# Patient Record
Sex: MALE | Race: WHITE | Employment: OTHER | URBAN - METROPOLITAN AREA
[De-identification: names, ages, dates, MRNs, and addresses within clinical notes are randomized per-mention and may not be internally consistent; named-entity substitution may affect disease eponyms.]

---

## 2017-06-20 ENCOUNTER — OFFICE VISIT (OUTPATIENT)
Dept: ENT CLINIC | Age: 77
End: 2017-06-20

## 2017-06-20 VITALS
TEMPERATURE: 97.8 F | DIASTOLIC BLOOD PRESSURE: 62 MMHG | HEIGHT: 70 IN | WEIGHT: 202 LBS | BODY MASS INDEX: 28.92 KG/M2 | SYSTOLIC BLOOD PRESSURE: 107 MMHG

## 2017-06-20 DIAGNOSIS — R07.0 THROAT PAIN: ICD-10-CM

## 2017-06-20 DIAGNOSIS — J04.0 ACUTE LARYNGITIS: Primary | ICD-10-CM

## 2017-06-20 DIAGNOSIS — R49.9 HOARSENESS OR CHANGING VOICE: ICD-10-CM

## 2017-06-20 PROCEDURE — 31575 DIAGNOSTIC LARYNGOSCOPY: CPT | Performed by: OTOLARYNGOLOGY

## 2017-06-20 PROCEDURE — 99213 OFFICE O/P EST LOW 20 MIN: CPT | Performed by: OTOLARYNGOLOGY

## 2017-06-20 RX ORDER — METHYLPREDNISOLONE 4 MG/1
TABLET ORAL
Qty: 1 KIT | Refills: 0 | Status: SHIPPED | OUTPATIENT
Start: 2017-06-20 | End: 2020-06-17

## 2017-06-20 RX ORDER — AMOXICILLIN 500 MG/1
500 CAPSULE ORAL 3 TIMES DAILY
Qty: 30 CAPSULE | Refills: 1 | Status: SHIPPED | OUTPATIENT
Start: 2017-06-20 | End: 2017-06-30

## 2017-06-21 ENCOUNTER — TELEPHONE (OUTPATIENT)
Dept: ENT CLINIC | Age: 77
End: 2017-06-21

## 2017-06-23 ENCOUNTER — TELEPHONE (OUTPATIENT)
Dept: ENT CLINIC | Age: 77
End: 2017-06-23

## 2017-07-06 ENCOUNTER — TELEPHONE (OUTPATIENT)
Dept: ENT CLINIC | Age: 77
End: 2017-07-06

## 2020-06-17 ENCOUNTER — OFFICE VISIT (OUTPATIENT)
Dept: ENT CLINIC | Age: 80
End: 2020-06-17
Payer: COMMERCIAL

## 2020-06-17 VITALS
WEIGHT: 207 LBS | TEMPERATURE: 97.7 F | HEART RATE: 58 BPM | DIASTOLIC BLOOD PRESSURE: 69 MMHG | HEIGHT: 70 IN | SYSTOLIC BLOOD PRESSURE: 138 MMHG | BODY MASS INDEX: 29.63 KG/M2

## 2020-06-17 PROCEDURE — 69210 REMOVE IMPACTED EAR WAX UNI: CPT | Performed by: OTOLARYNGOLOGY

## 2020-06-17 NOTE — PROGRESS NOTES
Cerumen removed right ear with suction and curettes. Ear drum and middle ear normal.  Hearing is now subjectively improved.     Follow up: As needed

## 2021-04-13 ENCOUNTER — NURSE ONLY (OUTPATIENT)
Dept: PRIMARY CARE CLINIC | Age: 81
End: 2021-04-13
Payer: COMMERCIAL

## 2021-04-13 DIAGNOSIS — Z01.818 PREOP EXAMINATION: Primary | ICD-10-CM

## 2021-04-13 LAB — SARS-COV-2: NOT DETECTED

## 2021-04-13 PROCEDURE — 99211 OFF/OP EST MAY X REQ PHY/QHP: CPT | Performed by: NURSE PRACTITIONER

## 2021-04-14 NOTE — PROGRESS NOTES
ENDOSCOPY PREOP PHONE INTERVIEW  INSTRUCTIONS:   Covid test was 4/13. Please continue to quarantine until your procedure    Follow all instructions / preps given to you from your doctor's office. If you have not received these instructions yet, please call the office immediately.  Enter the MAIN entrance located on [employer] [address] and report to the desk on left side of the lobby. Arrival Time: 0800 for your procedure scheduled at: 0930   Bring your insurance & photo ID with you.  Dress comfortably. No lotion, powders or jewelry   Bring an accurate list of your medications with doses/ frequency with you day of the procedure, including over the counter medications. If you are taking blood thinners, ASA or diabetic medication, make sure to call Dr. Christy Velasquez  or your PCP for instructions prior to your procedure.  Arrange for someone to be with you and sign you out & drive you home after your procedure.  We are allowing 1 visitor with you in the hospital.        If you have further questions, you may contact your Endoscopist's office or Pre Admission Testing staff at [phone]  Alma Barraza 4/14/2021 .5:02 PM

## 2021-04-19 ENCOUNTER — HOSPITAL ENCOUNTER (OUTPATIENT)
Age: 81
Setting detail: OUTPATIENT SURGERY
Discharge: HOME | End: 2021-04-19
Attending: INTERNAL MEDICINE | Admitting: INTERNAL MEDICINE
Payer: COMMERCIAL

## 2021-04-19 VITALS
SYSTOLIC BLOOD PRESSURE: 142 MMHG | DIASTOLIC BLOOD PRESSURE: 69 MMHG | HEIGHT: 71 IN | BODY MASS INDEX: 30.38 KG/M2 | TEMPERATURE: 97.5 F | WEIGHT: 217 LBS | HEART RATE: 76 BPM | OXYGEN SATURATION: 96 % | RESPIRATION RATE: 18 BRPM

## 2021-04-19 PROCEDURE — 7100000011 HC PHASE II RECOVERY - ADDTL 15 MIN: Performed by: INTERNAL MEDICINE

## 2021-04-19 PROCEDURE — 7100000010 HC PHASE II RECOVERY - FIRST 15 MIN: Performed by: INTERNAL MEDICINE

## 2021-04-19 PROCEDURE — 2580000003 HC RX 258: Performed by: INTERNAL MEDICINE

## 2021-04-19 PROCEDURE — 99152 MOD SED SAME PHYS/QHP 5/>YRS: CPT | Performed by: INTERNAL MEDICINE

## 2021-04-19 PROCEDURE — 3609027000 HC COLONOSCOPY: Performed by: INTERNAL MEDICINE

## 2021-04-19 PROCEDURE — 6360000002 HC RX W HCPCS: Performed by: INTERNAL MEDICINE

## 2021-04-19 PROCEDURE — 99153 MOD SED SAME PHYS/QHP EA: CPT | Performed by: INTERNAL MEDICINE

## 2021-04-19 PROCEDURE — 2709999900 HC NON-CHARGEABLE SUPPLY: Performed by: INTERNAL MEDICINE

## 2021-04-19 RX ORDER — FENTANYL CITRATE 50 UG/ML
INJECTION, SOLUTION INTRAMUSCULAR; INTRAVENOUS PRN
Status: DISCONTINUED | OUTPATIENT
Start: 2021-04-19 | End: 2021-04-19 | Stop reason: ALTCHOICE

## 2021-04-19 RX ORDER — DEXTROSE AND SODIUM CHLORIDE 5; .9 G/100ML; G/100ML
INJECTION, SOLUTION INTRAVENOUS ONCE
Status: COMPLETED | OUTPATIENT
Start: 2021-04-19 | End: 2021-04-19

## 2021-04-19 RX ORDER — MIDAZOLAM HYDROCHLORIDE 1 MG/ML
INJECTION INTRAMUSCULAR; INTRAVENOUS PRN
Status: DISCONTINUED | OUTPATIENT
Start: 2021-04-19 | End: 2021-04-19 | Stop reason: ALTCHOICE

## 2021-04-19 RX ADMIN — DEXTROSE AND SODIUM CHLORIDE: 5; 900 INJECTION, SOLUTION INTRAVENOUS at 08:53

## 2021-04-19 ASSESSMENT — PAIN - FUNCTIONAL ASSESSMENT
PAIN_FUNCTIONAL_ASSESSMENT: FACES
PAIN_FUNCTIONAL_ASSESSMENT: 0-10
PAIN_FUNCTIONAL_ASSESSMENT: FACES

## 2021-04-19 NOTE — H&P
1963     Years since quittin.3    Smokeless tobacco: Never Used   Substance and Sexual Activity    Alcohol use: Yes     Comment: rarely    Drug use: No    Sexual activity: Not on file   Lifestyle    Physical activity     Days per week: Not on file     Minutes per session: Not on file    Stress: Not on file   Relationships    Social connections     Talks on phone: Not on file     Gets together: Not on file     Attends Rastafari service: Not on file     Active member of club or organization: Not on file     Attends meetings of clubs or organizations: Not on file     Relationship status: Not on file    Intimate partner violence     Fear of current or ex partner: Not on file     Emotionally abused: Not on file     Physically abused: Not on file     Forced sexual activity: Not on file   Other Topics Concern    Not on file   Social History Narrative    ** Merged History Encounter **            Family Hx:   Family History   Problem Relation Age of Onset    Diabetes Mother         dm    Hypertension Mother     Hypertension Father     Cancer Father         bladder       Physical Exam:  Vital Signs: Ht 5' 10\" (1.778 m)   Wt 205 lb (93 kg)   BMI 29.41 kg/m²    Pulmonary: Normal  Cardiac: Normal  Abdomen: Normal    Pre-Procedure Assessment / Plan:  ASA Classification: Class 2 - A normal healthy patient with mild systemic disease  Level of Sedation Plan: Moderate sedation   Mallampati Score:  I (soft palate, uvula, fauces, tonsillar pillars visible)  Post Procedure plan: Return to same level of care    Colonoscopy Interval History:  3 or more years since last colonoscopy, Less than 3 years since the patient's last colonoscopy due to medical reasons and Less than 3 years since the patient's last colonoscopy due to system reasons    Medical Reason for Colonoscopy before 3 years last colonoscopy incomplete, last colonoscopy had inadequate prep, piecemeal removal of adenomas and last colonoscopy found greater than 10 adenomas  System Reasons for Colonoscopy before 3 years:   previous colonoscopy report unavailable or unable to locate    I assessed the patient and find that the patient is in satisfactory condition to proceed with the planned procedure and sedation plan. Risks/benefits/alternatives of procedure discussed with patient and any present family members. Risks including, but not limited to: bleeding, perforation, post polypectomy syndrome, splenic injury, need for additional procedures or surgery, risks of anesthesia. Patient understands it is their responsibility to call office for pathology results if they do not hear from my office within 1-2 weeks. All questions answered.     Tadeo Horta MD  4/19/2021

## 2021-04-19 NOTE — BRIEF OP NOTE
Brief Postoperative Note      Patient: Merlyn Ram  YOB: 1940  MRN: [id number]    Date of Procedure: 4/19/2021    Pre-Op Diagnosis: Hx of colonic polyps [Z86.010]    Post-Op Diagnosis: Same       Procedure(s):  COLONOSCOPY    Surgeon(s):  Sapna Castañeda MD    Assistant:  * No surgical staff found *    Anesthesia: IV Sedation    Estimated Blood Loss (mL): Minimal    Complications: None    Specimens:   * No specimens in log *    Implants:  * No implants in log *      Drains: * No LDAs found *    Findings: diverticulosis    Electronically signed by Ju Monroe MD on 4/19/2021 at 9:39 AM

## 2021-06-02 ENCOUNTER — CLINICAL DOCUMENTATION (OUTPATIENT)
Dept: OTHER | Age: 81
End: 2021-06-02

## 2022-12-20 ENCOUNTER — OFFICE VISIT (OUTPATIENT)
Dept: ENT CLINIC | Age: 82
End: 2022-12-20
Payer: MEDICARE

## 2022-12-20 VITALS
SYSTOLIC BLOOD PRESSURE: 123 MMHG | TEMPERATURE: 97.4 F | BODY MASS INDEX: 28.67 KG/M2 | DIASTOLIC BLOOD PRESSURE: 62 MMHG | HEIGHT: 71 IN | WEIGHT: 204.8 LBS | HEART RATE: 60 BPM

## 2022-12-20 DIAGNOSIS — H90.6 MIXED CONDUCTIVE AND SENSORINEURAL HEARING LOSS OF BOTH EARS: ICD-10-CM

## 2022-12-20 DIAGNOSIS — R49.9 HOARSENESS OR CHANGING VOICE: Primary | ICD-10-CM

## 2022-12-20 DIAGNOSIS — H61.23 BILATERAL IMPACTED CERUMEN: ICD-10-CM

## 2022-12-20 PROCEDURE — 99213 OFFICE O/P EST LOW 20 MIN: CPT | Performed by: OTOLARYNGOLOGY

## 2022-12-20 PROCEDURE — G8484 FLU IMMUNIZE NO ADMIN: HCPCS | Performed by: OTOLARYNGOLOGY

## 2022-12-20 PROCEDURE — 3078F DIAST BP <80 MM HG: CPT | Performed by: OTOLARYNGOLOGY

## 2022-12-20 PROCEDURE — 1123F ACP DISCUSS/DSCN MKR DOCD: CPT | Performed by: OTOLARYNGOLOGY

## 2022-12-20 PROCEDURE — 31575 DIAGNOSTIC LARYNGOSCOPY: CPT | Performed by: OTOLARYNGOLOGY

## 2022-12-20 PROCEDURE — G8417 CALC BMI ABV UP PARAM F/U: HCPCS | Performed by: OTOLARYNGOLOGY

## 2022-12-20 PROCEDURE — G8427 DOCREV CUR MEDS BY ELIG CLIN: HCPCS | Performed by: OTOLARYNGOLOGY

## 2022-12-20 PROCEDURE — 3074F SYST BP LT 130 MM HG: CPT | Performed by: OTOLARYNGOLOGY

## 2022-12-20 PROCEDURE — 1036F TOBACCO NON-USER: CPT | Performed by: OTOLARYNGOLOGY

## 2022-12-20 PROCEDURE — 69210 REMOVE IMPACTED EAR WAX UNI: CPT | Performed by: OTOLARYNGOLOGY

## 2022-12-20 NOTE — PROGRESS NOTES
FOLLOW UP VISIT:    CHIEF COMPLAINT: Hoarseness    INTERIM HISTORY: Sensorineural hearing loss. Has hearing aids. Hearing has decreased to recently. He went to the hearing aid dealer and they noted that his ears are impacted with cerumen. In addition he has a concern about his voice. He is a professional voice user. He relates the onset to a respiratory infection which was not COVID-19. PAST MEDICAL HISTORY:   Social History     Tobacco Use   Smoking Status Former    Types: Cigarettes    Quit date: 1964    Years since quittin.0   Smokeless Tobacco Never                                                    Social History     Substance and Sexual Activity   Alcohol Use Yes    Comment: rare- few times year                                                    Current Outpatient Medications:     cetirizine (ZYRTEC) 10 MG tablet, Take 10 mg by mouth., Disp: , Rfl:     atenolol (TENORMIN) 50 MG tablet, Take 50 mg by mouth., Disp: , Rfl:     tamsulosin (FLOMAX) 0.4 MG capsule, Take 1 capsule by mouth daily. , Disp: 90 capsule, Rfl: 3    aspirin 81 MG EC tablet, Take 81 mg by mouth daily.   , Disp: , Rfl:                                                  Past Medical History:   Diagnosis Date    Allergic rhinitis     chronic     Dental disease     Diverticulosis of colon     GERD (gastroesophageal reflux disease)     Hearing loss     Hepatic abscess 01    Hx of colonic polyp     ascending, small focus of tubular adenoma (low grade dysplasia)    Hypertension     Internal hemorrhoid     Pulmonary embolism ([address] 2012    post op    Sigmoid diverticulitis                                                     Past Surgical History:   Procedure Laterality Date    COLONOSCOPY  16    3y    COLONOSCOPY N/A 2021    COLONOSCOPY performed by Margarita Sibley MD at [facility]  2012    R Ankle    LEG SURGERY  teenage    infection    OTHER SURGICAL HISTORY  3/22/2012    [facility] bi-maleolar fx    SINUS SURGERY      TONSILLECTOMY         FAMILY HISTORY: Family history reviewed and except as pertinent to the interim history is not contributory. REVIEW OF SYSTEMS:  All pertinent positive and negative findings included in HPI. Otherwise, all other systems are reviewed and are negative    PHYSICAL EXAMINATION:   GENERAL: wdwn- no acute distress  RESPIRATORY: No stridor. COMMUNICATION :  Normal voice  MENTAL STATUS: Alert and oriented x3  HEAD AND FACE: No skin lesions of the face. EXTERNAL EARS AND NOSE: The external ears and nasal pyramid are normal.  FACIAL MUSCLES: All branches of the facial nerve intact. FACE PALPATION: Zygomatic arches and orbital rims are intact. No tenderness over the sinuses. EXTRAOCULAR MUSCLES: Intact with full range of motion. OTOSCOPY: Cerumen occludes both external auditory canals. Cerumen removed from both external auditory canals. TUNING FORKS:  Rinne ++ Alva midline at 512 Hz  INTRANASAL:  Septum midline, turbinates normal, meati clear. LIPS, TEETH, GINGIVA:  Normal mucosa  PHARYNX:  Normal  NECK:  No masses. LYMPH NODES: No cervical lymphadenopathy. SALIVARY GLANDS: Parotid and submandibular glands normal.  THYROID: No goiter or thyroid nodules palpable. As the patient has symptoms suggestive of disease in the larynx or hypopharynx, fiberoptic laryngoscopy is performed. FIBEROPTIC LARYNGOSCOPY:  Nares topically anaesthetized with lidocaine spray. Fiberoptic scope passed per naris into nasopharynx and hypopharyrnx and larynx visualized. Normal tongue base                                                          Normal epiglottis                                                          Normal vocal cords                                                          Normal pyriform sinuses   IMPRESSION: Normal larynx.     PLAN: I have reassured the patient that there is no pathology on his vocal cords and that they move well. Trial of guaifenesin 2400 mg/day. FOLLOW-UP: As needed.

## 2023-03-09 ENCOUNTER — HOSPITAL ENCOUNTER (OUTPATIENT)
Dept: CARDIAC REHAB | Age: 83
Setting detail: THERAPIES SERIES
Discharge: HOME | End: 2023-03-09
Payer: MEDICARE

## 2023-03-09 PROCEDURE — 93798 PHYS/QHP OP CAR RHAB W/ECG: CPT

## 2023-03-15 ENCOUNTER — HOSPITAL ENCOUNTER (OUTPATIENT)
Dept: CARDIAC REHAB | Age: 83
Setting detail: THERAPIES SERIES
Discharge: HOME | End: 2023-03-15
Payer: MEDICARE

## 2023-03-15 PROCEDURE — 93798 PHYS/QHP OP CAR RHAB W/ECG: CPT

## 2023-03-17 ENCOUNTER — HOSPITAL ENCOUNTER (OUTPATIENT)
Dept: CARDIAC REHAB | Age: 83
Setting detail: THERAPIES SERIES
Discharge: HOME | End: 2023-03-17
Payer: MEDICARE

## 2023-03-17 PROCEDURE — 93798 PHYS/QHP OP CAR RHAB W/ECG: CPT

## 2023-03-20 ENCOUNTER — HOSPITAL ENCOUNTER (OUTPATIENT)
Dept: CARDIAC REHAB | Age: 83
Setting detail: THERAPIES SERIES
Discharge: HOME | End: 2023-03-20
Payer: MEDICARE

## 2023-03-20 PROCEDURE — 93798 PHYS/QHP OP CAR RHAB W/ECG: CPT

## 2023-03-22 ENCOUNTER — HOSPITAL ENCOUNTER (OUTPATIENT)
Dept: CARDIAC REHAB | Age: 83
Setting detail: THERAPIES SERIES
Discharge: HOME | End: 2023-03-22
Payer: MEDICARE

## 2023-03-22 PROCEDURE — 93798 PHYS/QHP OP CAR RHAB W/ECG: CPT

## 2023-03-24 ENCOUNTER — HOSPITAL ENCOUNTER (OUTPATIENT)
Dept: CARDIAC REHAB | Age: 83
Setting detail: THERAPIES SERIES
Discharge: HOME | End: 2023-03-24
Payer: MEDICARE

## 2023-03-24 PROCEDURE — 93798 PHYS/QHP OP CAR RHAB W/ECG: CPT

## 2023-03-27 ENCOUNTER — HOSPITAL ENCOUNTER (OUTPATIENT)
Dept: CARDIAC REHAB | Age: 83
Setting detail: THERAPIES SERIES
Discharge: HOME | End: 2023-03-27
Payer: MEDICARE

## 2023-03-27 PROCEDURE — 93798 PHYS/QHP OP CAR RHAB W/ECG: CPT

## 2023-03-29 ENCOUNTER — HOSPITAL ENCOUNTER (OUTPATIENT)
Dept: CARDIAC REHAB | Age: 83
Setting detail: THERAPIES SERIES
Discharge: HOME | End: 2023-03-29
Payer: MEDICARE

## 2023-03-29 PROCEDURE — 93798 PHYS/QHP OP CAR RHAB W/ECG: CPT

## 2023-03-31 ENCOUNTER — HOSPITAL ENCOUNTER (OUTPATIENT)
Dept: CARDIAC REHAB | Age: 83
Setting detail: THERAPIES SERIES
Discharge: HOME | End: 2023-03-31
Payer: MEDICARE

## 2023-03-31 PROCEDURE — 93798 PHYS/QHP OP CAR RHAB W/ECG: CPT

## 2023-04-03 ENCOUNTER — HOSPITAL ENCOUNTER (OUTPATIENT)
Dept: CARDIAC REHAB | Age: 83
Setting detail: THERAPIES SERIES
Discharge: HOME | End: 2023-04-03
Payer: MEDICARE

## 2023-04-03 PROCEDURE — 93798 PHYS/QHP OP CAR RHAB W/ECG: CPT

## 2023-04-05 ENCOUNTER — HOSPITAL ENCOUNTER (OUTPATIENT)
Dept: CARDIAC REHAB | Age: 83
Setting detail: THERAPIES SERIES
Discharge: HOME | End: 2023-04-05
Payer: MEDICARE

## 2023-04-05 PROCEDURE — 93798 PHYS/QHP OP CAR RHAB W/ECG: CPT

## 2023-04-07 ENCOUNTER — HOSPITAL ENCOUNTER (OUTPATIENT)
Dept: CARDIAC REHAB | Age: 83
Setting detail: THERAPIES SERIES
Discharge: HOME | End: 2023-04-07
Payer: MEDICARE

## 2023-04-07 PROCEDURE — 93798 PHYS/QHP OP CAR RHAB W/ECG: CPT

## 2023-04-17 ENCOUNTER — HOSPITAL ENCOUNTER (OUTPATIENT)
Dept: CARDIAC REHAB | Age: 83
Setting detail: THERAPIES SERIES
Discharge: HOME | End: 2023-04-17
Payer: MEDICARE

## 2023-04-17 PROCEDURE — 93798 PHYS/QHP OP CAR RHAB W/ECG: CPT

## 2023-04-19 ENCOUNTER — HOSPITAL ENCOUNTER (OUTPATIENT)
Dept: CARDIAC REHAB | Age: 83
Setting detail: THERAPIES SERIES
Discharge: HOME | End: 2023-04-19
Payer: MEDICARE

## 2023-04-19 PROCEDURE — 93798 PHYS/QHP OP CAR RHAB W/ECG: CPT

## 2023-04-21 ENCOUNTER — HOSPITAL ENCOUNTER (OUTPATIENT)
Dept: CARDIAC REHAB | Age: 83
Setting detail: THERAPIES SERIES
Discharge: HOME | End: 2023-04-21
Payer: MEDICARE

## 2023-04-21 PROCEDURE — 93798 PHYS/QHP OP CAR RHAB W/ECG: CPT

## 2023-04-24 ENCOUNTER — APPOINTMENT (OUTPATIENT)
Dept: CARDIAC REHAB | Age: 83
End: 2023-04-24
Payer: MEDICARE

## 2023-04-26 ENCOUNTER — HOSPITAL ENCOUNTER (OUTPATIENT)
Dept: CARDIAC REHAB | Age: 83
Setting detail: THERAPIES SERIES
Discharge: HOME | End: 2023-04-26
Payer: MEDICARE

## 2023-04-26 PROCEDURE — 93798 PHYS/QHP OP CAR RHAB W/ECG: CPT

## 2023-04-28 ENCOUNTER — HOSPITAL ENCOUNTER (OUTPATIENT)
Dept: CARDIAC REHAB | Age: 83
Setting detail: THERAPIES SERIES
Discharge: HOME | End: 2023-04-28
Payer: MEDICARE

## 2023-04-28 PROCEDURE — 93798 PHYS/QHP OP CAR RHAB W/ECG: CPT

## 2023-05-01 ENCOUNTER — HOSPITAL ENCOUNTER (OUTPATIENT)
Dept: CARDIAC REHAB | Age: 83
Setting detail: THERAPIES SERIES
Discharge: HOME | End: 2023-05-01
Payer: MEDICARE

## 2023-05-01 PROCEDURE — 93798 PHYS/QHP OP CAR RHAB W/ECG: CPT

## 2023-05-03 ENCOUNTER — HOSPITAL ENCOUNTER (OUTPATIENT)
Dept: CARDIAC REHAB | Age: 83
Setting detail: THERAPIES SERIES
Discharge: HOME | End: 2023-05-03
Payer: MEDICARE

## 2023-05-03 PROCEDURE — 93798 PHYS/QHP OP CAR RHAB W/ECG: CPT

## 2023-05-05 ENCOUNTER — HOSPITAL ENCOUNTER (OUTPATIENT)
Dept: CARDIAC REHAB | Age: 83
Setting detail: THERAPIES SERIES
Discharge: HOME | End: 2023-05-05
Payer: MEDICARE

## 2023-05-05 PROCEDURE — 93798 PHYS/QHP OP CAR RHAB W/ECG: CPT

## 2023-05-08 ENCOUNTER — HOSPITAL ENCOUNTER (OUTPATIENT)
Dept: CARDIAC REHAB | Age: 83
Setting detail: THERAPIES SERIES
Discharge: HOME | End: 2023-05-08
Payer: MEDICARE

## 2023-05-08 PROCEDURE — 93798 PHYS/QHP OP CAR RHAB W/ECG: CPT

## 2023-05-10 ENCOUNTER — HOSPITAL ENCOUNTER (OUTPATIENT)
Dept: CARDIAC REHAB | Age: 83
Setting detail: THERAPIES SERIES
Discharge: HOME | End: 2023-05-10
Payer: MEDICARE

## 2023-05-10 PROCEDURE — 93798 PHYS/QHP OP CAR RHAB W/ECG: CPT

## 2023-05-12 ENCOUNTER — HOSPITAL ENCOUNTER (OUTPATIENT)
Dept: CARDIAC REHAB | Age: 83
Setting detail: THERAPIES SERIES
Discharge: HOME | End: 2023-05-12
Payer: MEDICARE

## 2023-05-12 PROCEDURE — 93798 PHYS/QHP OP CAR RHAB W/ECG: CPT

## 2023-05-15 ENCOUNTER — APPOINTMENT (OUTPATIENT)
Dept: CARDIAC REHAB | Age: 83
End: 2023-05-15
Payer: MEDICARE

## 2023-05-17 ENCOUNTER — HOSPITAL ENCOUNTER (OUTPATIENT)
Dept: CARDIAC REHAB | Age: 83
Setting detail: THERAPIES SERIES
Discharge: HOME | End: 2023-05-17
Payer: MEDICARE

## 2023-05-17 PROCEDURE — 93798 PHYS/QHP OP CAR RHAB W/ECG: CPT

## 2023-05-19 ENCOUNTER — HOSPITAL ENCOUNTER (OUTPATIENT)
Dept: CARDIAC REHAB | Age: 83
Setting detail: THERAPIES SERIES
Discharge: HOME | End: 2023-05-19
Payer: MEDICARE

## 2023-05-19 PROCEDURE — 93798 PHYS/QHP OP CAR RHAB W/ECG: CPT

## 2023-05-22 ENCOUNTER — APPOINTMENT (OUTPATIENT)
Dept: CARDIAC REHAB | Age: 83
End: 2023-05-22
Payer: MEDICARE

## 2023-05-24 ENCOUNTER — HOSPITAL ENCOUNTER (OUTPATIENT)
Dept: CARDIAC REHAB | Age: 83
Setting detail: THERAPIES SERIES
Discharge: HOME | End: 2023-05-24
Payer: MEDICARE

## 2023-05-24 PROCEDURE — 93798 PHYS/QHP OP CAR RHAB W/ECG: CPT

## 2023-05-26 ENCOUNTER — HOSPITAL ENCOUNTER (OUTPATIENT)
Dept: CARDIAC REHAB | Age: 83
Setting detail: THERAPIES SERIES
Discharge: HOME | End: 2023-05-26
Payer: MEDICARE

## 2023-05-26 PROCEDURE — 93798 PHYS/QHP OP CAR RHAB W/ECG: CPT

## 2023-05-31 ENCOUNTER — HOSPITAL ENCOUNTER (OUTPATIENT)
Dept: CARDIAC REHAB | Age: 83
Setting detail: THERAPIES SERIES
Discharge: HOME | End: 2023-05-31
Payer: MEDICARE

## 2023-05-31 PROCEDURE — 93798 PHYS/QHP OP CAR RHAB W/ECG: CPT

## 2023-06-02 ENCOUNTER — HOSPITAL ENCOUNTER (OUTPATIENT)
Dept: CARDIAC REHAB | Age: 83
Setting detail: THERAPIES SERIES
Discharge: HOME | End: 2023-06-02
Payer: MEDICARE

## 2023-06-02 PROCEDURE — 93798 PHYS/QHP OP CAR RHAB W/ECG: CPT

## 2023-06-05 ENCOUNTER — HOSPITAL ENCOUNTER (OUTPATIENT)
Dept: CARDIAC REHAB | Age: 83
Setting detail: THERAPIES SERIES
Discharge: HOME | End: 2023-06-05
Payer: MEDICARE

## 2023-06-05 PROCEDURE — 93798 PHYS/QHP OP CAR RHAB W/ECG: CPT

## 2023-06-07 ENCOUNTER — HOSPITAL ENCOUNTER (OUTPATIENT)
Dept: CARDIAC REHAB | Age: 83
Setting detail: THERAPIES SERIES
Discharge: HOME | End: 2023-06-07
Payer: MEDICARE

## 2023-06-07 PROCEDURE — 93798 PHYS/QHP OP CAR RHAB W/ECG: CPT

## 2023-06-09 ENCOUNTER — HOSPITAL ENCOUNTER (OUTPATIENT)
Dept: CARDIAC REHAB | Age: 83
Setting detail: THERAPIES SERIES
Discharge: HOME | End: 2023-06-09
Payer: MEDICARE

## 2023-06-09 PROCEDURE — 93798 PHYS/QHP OP CAR RHAB W/ECG: CPT

## 2023-06-12 ENCOUNTER — APPOINTMENT (OUTPATIENT)
Dept: CARDIAC REHAB | Age: 83
End: 2023-06-12
Payer: MEDICARE

## 2023-06-14 ENCOUNTER — APPOINTMENT (OUTPATIENT)
Dept: CARDIAC REHAB | Age: 83
End: 2023-06-14
Payer: MEDICARE

## 2023-08-22 ENCOUNTER — TELEPHONE (OUTPATIENT)
Dept: ENT CLINIC | Age: 83
End: 2023-08-22

## 2023-08-22 NOTE — TELEPHONE ENCOUNTER
Patient would like to have RX called in for laryengitis please let patient know pt does have appt 8/23 thank you

## 2023-08-23 ENCOUNTER — OFFICE VISIT (OUTPATIENT)
Dept: ENT CLINIC | Age: 83
End: 2023-08-23
Payer: MEDICARE

## 2023-08-23 VITALS
TEMPERATURE: 97.5 F | HEART RATE: 62 BPM | HEIGHT: 70 IN | SYSTOLIC BLOOD PRESSURE: 136 MMHG | DIASTOLIC BLOOD PRESSURE: 61 MMHG | BODY MASS INDEX: 28.32 KG/M2 | WEIGHT: 197.8 LBS

## 2023-08-23 DIAGNOSIS — R49.9 HOARSENESS OR CHANGING VOICE: Primary | ICD-10-CM

## 2023-08-23 DIAGNOSIS — J38.4 LARYNGEAL EDEMA: ICD-10-CM

## 2023-08-23 PROCEDURE — 99213 OFFICE O/P EST LOW 20 MIN: CPT | Performed by: OTOLARYNGOLOGY

## 2023-08-23 PROCEDURE — G8417 CALC BMI ABV UP PARAM F/U: HCPCS | Performed by: OTOLARYNGOLOGY

## 2023-08-23 PROCEDURE — 3078F DIAST BP <80 MM HG: CPT | Performed by: OTOLARYNGOLOGY

## 2023-08-23 PROCEDURE — G8427 DOCREV CUR MEDS BY ELIG CLIN: HCPCS | Performed by: OTOLARYNGOLOGY

## 2023-08-23 PROCEDURE — 1123F ACP DISCUSS/DSCN MKR DOCD: CPT | Performed by: OTOLARYNGOLOGY

## 2023-08-23 PROCEDURE — 1036F TOBACCO NON-USER: CPT | Performed by: OTOLARYNGOLOGY

## 2023-08-23 PROCEDURE — 3075F SYST BP GE 130 - 139MM HG: CPT | Performed by: OTOLARYNGOLOGY

## 2023-08-23 PROCEDURE — 96372 THER/PROPH/DIAG INJ SC/IM: CPT | Performed by: OTOLARYNGOLOGY

## 2023-08-23 RX ORDER — ATORVASTATIN CALCIUM 40 MG/1
40 TABLET, FILM COATED ORAL DAILY
COMMUNITY

## 2023-08-23 RX ORDER — BETAMETHASONE SODIUM PHOSPHATE AND BETAMETHASONE ACETATE 3; 3 MG/ML; MG/ML
6 INJECTION, SUSPENSION INTRA-ARTICULAR; INTRALESIONAL; INTRAMUSCULAR; SOFT TISSUE ONCE
Status: COMPLETED | OUTPATIENT
Start: 2023-08-23 | End: 2023-08-23

## 2023-08-23 RX ADMIN — BETAMETHASONE SODIUM PHOSPHATE AND BETAMETHASONE ACETATE 6 MG: 3; 3 INJECTION, SUSPENSION INTRA-ARTICULAR; INTRALESIONAL; INTRAMUSCULAR; SOFT TISSUE at 16:23

## 2023-08-31 ENCOUNTER — OFFICE VISIT (OUTPATIENT)
Dept: ENT CLINIC | Age: 83
End: 2023-08-31
Payer: MEDICARE

## 2023-08-31 VITALS
SYSTOLIC BLOOD PRESSURE: 108 MMHG | DIASTOLIC BLOOD PRESSURE: 56 MMHG | WEIGHT: 195 LBS | HEIGHT: 70 IN | TEMPERATURE: 97.5 F | BODY MASS INDEX: 27.92 KG/M2 | HEART RATE: 63 BPM

## 2023-08-31 DIAGNOSIS — R49.9 HOARSENESS OR CHANGING VOICE: Primary | ICD-10-CM

## 2023-08-31 DIAGNOSIS — J38.4 LARYNGEAL EDEMA: ICD-10-CM

## 2023-08-31 PROCEDURE — 3078F DIAST BP <80 MM HG: CPT | Performed by: OTOLARYNGOLOGY

## 2023-08-31 PROCEDURE — G8417 CALC BMI ABV UP PARAM F/U: HCPCS | Performed by: OTOLARYNGOLOGY

## 2023-08-31 PROCEDURE — 1036F TOBACCO NON-USER: CPT | Performed by: OTOLARYNGOLOGY

## 2023-08-31 PROCEDURE — 3074F SYST BP LT 130 MM HG: CPT | Performed by: OTOLARYNGOLOGY

## 2023-08-31 PROCEDURE — G8427 DOCREV CUR MEDS BY ELIG CLIN: HCPCS | Performed by: OTOLARYNGOLOGY

## 2023-08-31 PROCEDURE — 1123F ACP DISCUSS/DSCN MKR DOCD: CPT | Performed by: OTOLARYNGOLOGY

## 2023-08-31 PROCEDURE — 99212 OFFICE O/P EST SF 10 MIN: CPT | Performed by: OTOLARYNGOLOGY

## 2023-08-31 RX ORDER — METHYLPREDNISOLONE 4 MG/1
TABLET ORAL
Qty: 1 KIT | Refills: 0 | Status: SHIPPED | OUTPATIENT
Start: 2023-08-31

## 2023-08-31 NOTE — PROGRESS NOTES
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 Fiberoptic laryngoscopy showed some vocal cord edema. The left is worse than the right. I prescribed betamethasone 6 mg and his voice improved quickly and he was able to do the performance. Now he is concerned about worsening voice. PAST MEDICAL HISTORY:   Social History     Tobacco Use   Smoking Status Former    Types: Cigarettes    Quit date: 1964    Years since quittin.7   Smokeless Tobacco Never                                                    Social History     Substance and Sexual Activity   Alcohol Use Yes    Comment: rare- few times year                                                    Current Outpatient Medications:     ticagrelor (BRILINTA) 90 MG TABS tablet, Take 1 tablet by mouth 2 times daily, Disp: , Rfl:     atorvastatin (LIPITOR) 40 MG tablet, Take 1 tablet by mouth daily, Disp: , Rfl:     cetirizine (ZYRTEC) 10 MG tablet, Take 1 tablet by mouth, Disp: , Rfl:     atenolol (TENORMIN) 50 MG tablet, Take 1 tablet by mouth, Disp: , Rfl:     tamsulosin (FLOMAX) 0.4 MG capsule, Take 1 capsule by mouth daily. , Disp: 90 capsule, Rfl: 3    aspirin 81 MG EC tablet, Take 1 tablet by mouth daily, Disp: , Rfl:                                                  Past Medical History:   Diagnosis Date    Allergic rhinitis     chronic     Dental disease     Diverticulosis of colon     GERD (gastroesophageal reflux disease)     Hearing loss     Hepatic abscess 01    Hx of colonic polyp     ascending, small focus of tubular adenoma (low grade dysplasia)    Hypertension     Internal hemorrhoid     Pulmonary embolism ([address]) 2012    post op    Sigmoid diverticulitis                                                     Past Surgical History:   Procedure Laterality Date    COLONOSCOPY  16    4y    COLONOSCOPY N/A

## 2024-10-22 ENCOUNTER — OFFICE VISIT (OUTPATIENT)
Dept: ENT CLINIC | Age: 84
End: 2024-10-22
Payer: MEDICARE

## 2024-10-22 VITALS
HEART RATE: 72 BPM | HEIGHT: 71 IN | WEIGHT: 195.8 LBS | SYSTOLIC BLOOD PRESSURE: 109 MMHG | DIASTOLIC BLOOD PRESSURE: 55 MMHG | TEMPERATURE: 97.9 F | BODY MASS INDEX: 27.41 KG/M2

## 2024-10-22 DIAGNOSIS — H90.6 MIXED CONDUCTIVE AND SENSORINEURAL HEARING LOSS OF BOTH EARS: ICD-10-CM

## 2024-10-22 DIAGNOSIS — H61.23 BILATERAL IMPACTED CERUMEN: Primary | ICD-10-CM

## 2024-10-22 PROCEDURE — 69210 REMOVE IMPACTED EAR WAX UNI: CPT | Performed by: OTOLARYNGOLOGY

## 2024-10-22 RX ORDER — CILOSTAZOL 100 MG/1
100 TABLET ORAL 2 TIMES DAILY
COMMUNITY

## 2024-10-22 RX ORDER — CLOPIDOGREL BISULFATE 75 MG/1
75 TABLET ORAL DAILY
COMMUNITY

## 2024-10-22 NOTE — PROGRESS NOTES
Cerumen removed from both external auditory canals with suction.  The tympanic membranes and middle ear spaces are normal.  The patient's hearing is improved with his hearing aids.

## (undated) DEVICE — CO2 CANNULA,SUPERSOFT, ADLT,7'O2,7'CO2: Brand: MEDLINE